# Patient Record
Sex: FEMALE | Race: BLACK OR AFRICAN AMERICAN | NOT HISPANIC OR LATINO | Employment: FULL TIME | URBAN - METROPOLITAN AREA
[De-identification: names, ages, dates, MRNs, and addresses within clinical notes are randomized per-mention and may not be internally consistent; named-entity substitution may affect disease eponyms.]

---

## 2021-10-04 ENCOUNTER — PRENATAL OFFICE VISIT (OUTPATIENT)
Dept: OBGYN | Facility: CLINIC | Age: 31
End: 2021-10-04

## 2021-10-04 VITALS — BODY MASS INDEX: 30.82 KG/M2 | WEIGHT: 185 LBS | HEIGHT: 65 IN

## 2021-10-04 DIAGNOSIS — Z34.90 SUPERVISION OF NORMAL PREGNANCY: Primary | ICD-10-CM

## 2021-10-04 PROCEDURE — 99207 PR NO CHARGE NURSE ONLY: CPT

## 2021-10-04 ASSESSMENT — MIFFLIN-ST. JEOR: SCORE: 1565.03
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
## 2021-10-11 ENCOUNTER — OFFICE VISIT (OUTPATIENT)
Dept: URGENT CARE | Facility: URGENT CARE | Age: 31
End: 2021-10-11

## 2021-10-11 VITALS
HEART RATE: 92 BPM | DIASTOLIC BLOOD PRESSURE: 79 MMHG | BODY MASS INDEX: 33.12 KG/M2 | SYSTOLIC BLOOD PRESSURE: 117 MMHG | WEIGHT: 199 LBS | TEMPERATURE: 99.1 F

## 2021-10-11 DIAGNOSIS — O20.9 VAGINAL BLEEDING IN PREGNANT PATIENT AT LESS THAN 20 WEEKS GESTATION: Primary | ICD-10-CM

## 2021-10-11 NOTE — PROGRESS NOTES
Patient presenting with vaginal spotting today. Patient is approximately 10 weeks pregnant.  Sent to ED for evaluation and treatment - advanced imaging.  Patient left in stable condition.

## 2021-10-12 ENCOUNTER — TELEPHONE (OUTPATIENT)
Dept: OBGYN | Facility: CLINIC | Age: 31
End: 2021-10-12

## 2021-10-12 DIAGNOSIS — O20.0 THREATENED MISCARRIAGE: Primary | ICD-10-CM

## 2021-10-12 NOTE — TELEPHONE ENCOUNTER
, 10w2d.  New Prenatal appt is scheduled with Dr. Cedillo on 10/19.    Pt states she went to the  ED last night due to spotting and cramping.  They did an HCG quant blood test that was 17,000 and a transvaginal US.  They told her it was too early to see everything on US.  They advised her to f/u with her OB provider in 2 days for another HCG quant blood test.    Pt attempted to schedule a f/u appt with Dr. Cedillo but he had no availability this week.    Routing to Dr. Cedillo to see if he would like to place orders for serial HCG quants or if he would like pt to be seen by another OB provider for an ED f/u.    Meena Lopez RN

## 2021-10-12 NOTE — TELEPHONE ENCOUNTER
I called Jenny and reviewed the ultrasound and the quant.  I suggest to repeat the ultrasound at 2 weeks ([facility] guidelines).  The quants may be performed, but I feel they are best in very early pregnancy.  The repeat ultrasound gives more information for me.    The lab order for the ABO and Rh placed (does not seem to have been done in the ER).   She will call [phone] to schedule for the ultrasound.  A new appointment in 2 weeks is made (too early for the first ob based on the ER Ultrasound) for follow up.   If the bleeding changes (pink spotting at this time) the plan will be updated.   Rex Cedillo MD

## 2021-10-16 ENCOUNTER — HEALTH MAINTENANCE LETTER (OUTPATIENT)
Age: 31
End: 2021-10-16

## 2021-10-26 ENCOUNTER — OFFICE VISIT (OUTPATIENT)
Dept: OBGYN | Facility: CLINIC | Age: 31
End: 2021-10-26

## 2021-10-26 VITALS
BODY MASS INDEX: 33.45 KG/M2 | WEIGHT: 201 LBS | HEART RATE: 80 BPM | DIASTOLIC BLOOD PRESSURE: 75 MMHG | SYSTOLIC BLOOD PRESSURE: 109 MMHG | OXYGEN SATURATION: 97 %

## 2021-10-26 DIAGNOSIS — O03.9 SPONTANEOUS MISCARRIAGE: Primary | ICD-10-CM

## 2021-10-26 DIAGNOSIS — Z67.90 BLOOD TYPE, RH POSITIVE: ICD-10-CM

## 2021-10-26 PROCEDURE — 99203 OFFICE O/P NEW LOW 30 MIN: CPT | Performed by: OBSTETRICS & GYNECOLOGY

## 2021-10-26 ASSESSMENT — PATIENT HEALTH QUESTIONNAIRE - PHQ9: SUM OF ALL RESPONSES TO PHQ QUESTIONS 1-9: 11

## 2021-10-26 NOTE — PROGRESS NOTES
Jenny is a 31 year old  (Patient's last menstrual period was 2021.) at 12.2 weeks based on LMP.  She is here for follow up from the ER.   She had been seen for vaginal bleeding and abdominal pain.   She had been using 4 pads a day for the bleeding.  She had some clots.  She might have passed tissue.    She reports that since she was seen over 1 week ago, she stopped bleeding.  She has not had any fever, chills.      We reviewed the 2 ultrasound reports she had at [facility] [place] and that the gestation seems to have been lowering in the uterus.      10/15/2021  US OB < 11 WEEKS TA VAGINAL   Final Result   IMPRESSION:     Definite intrauterine pregnancy as shown on 10/11/2021. The current examination shows that the gestational sac has moved from the upper endometrial cavity down into the lower uterine segment/endocervical canal, in keeping with spontaneous  in progress. A small yolk sac was present within the gestational sac, not seen clearly today. No identifiable embryo or embryonic cardiac activity.       10/11/2021   US:  OB<11 weeks TA and TV  IMPRESSION:   1.  Probable very early intrauterine pregnancy with sonographic gestational age of six weeks two days.  Gestational sac is with an internal yolk sac.  Recommend repeat sonogram in 11 days.  If no embryo is identified on the followup exam, it is a pregnancy failure. Serial beta hCG may be helpful as well.   2.  Nonvisualized ovaries.   3.  No abnormal adnexal mass or free pelvic fluid.        Quant HCG levels from [facility] [place]    10/11/2021  HCG TOTAL, SERUM 17,907 (H)       10/15/2021          HCG TOTAL, SERUM 13,124 (*)         No past medical history on file.    Past Surgical History:   Procedure Laterality Date     vaginal cyst removal          No Known Allergies    Current Outpatient Medications   Medication Sig Dispense Refill     Prenatal Vit-Fe Fumarate-FA (PRENATAL VITAMIN PO)  (Patient not taking: Reported on 10/26/2021)         Social History      Socioeconomic History     Marital status: Single     Spouse name: Not on file     Number of children: Not on file     Years of education: Not on file     Highest education level: Not on file   Occupational History     Not on file   Tobacco Use     Smoking status: Never Smoker     Smokeless tobacco: Never Used   Vaping Use     Vaping Use: Former     Substances: Nicotine, Flavoring   Substance and Sexual Activity     Alcohol use: Not Currently     Drug use: Not Currently     Types: Marijuana     Sexual activity: Yes     Partners: Male   Other Topics Concern     Not on file   Social History Narrative     Not on file     Social Determinants of Health     Financial Resource Strain:      Difficulty of Paying Living Expenses:    Food Insecurity:      Worried About Running Out of Food in the Last Year:      Ran Out of Food in the Last Year:    Transportation Needs:      Lack of Transportation (Medical):      Lack of Transportation (Non-Medical):    Physical Activity:      Days of Exercise per Week:      Minutes of Exercise per Session:    Stress:      Feeling of Stress :    Social Connections:      Frequency of Communication with Friends and Family:      Frequency of Social Gatherings with Friends and Family:      Attends Yarsanism Services:      Active Member of Clubs or Organizations:      Attends Club or Organization Meetings:      Marital Status:    Intimate Partner Violence:      Fear of Current or Ex-Partner:      Emotionally Abused:      Physically Abused:      Sexually Abused:        Family History   Problem Relation Age of Onset     Cerebrovascular Disease Mother      Diabetes Mother      Cerebrovascular Disease Father      Diabetes Father      Migraines Sister      Migraines Sister          Review of Systems:  10 point ROS of systems including Constitutional, Eyes, Respiratory, Cardiovascular, Gastroenterology, Genitourinary, Integumentary, Muscularskeletal, Psychiatric were all negative except for pertinent  positives noted in my HPI and in the PMH.      Exam  /75 (BP Location: Right arm, Cuff Size: Adult Regular)   Pulse 80   Wt 91.2 kg (201 lb)   LMP 08/01/2021   SpO2 97%   BMI 33.45 kg/m     PHQ-9=11  General:  WNWD female, NAD  Alert  Oriented x 3  Gait:  Normal  Skin:  Normal skin turgor  HEENT:  NC/AT, EOMI  Abdomen:  Non-tender, non-distended.  Vulva: No external lesions, normal hair distribution, no adenopathy  BUS:  Normal, no masses noted  Urethra:  No hypermobility seen  Urethral meatus:  No masses noted  Vagina: Moist, pink, well rugated, no lesions  Cervix: Closed. Smooth, pink, no visible lesions  Uterus: Normal size, anteverted, non-tender, mobile  Ovaries: No mass, non-tender, mobile  Perianal:  No masses noted  Extremities:  No clubbing, no cyanosis and no edema.      Assessment:  Spontaneous miscarriage  Rh Positive      Plan:  We reviewed miscarriages and that miscarriage occurs ~ 1 in 5 pregnancy events and that there was no direct event or prevention  that the patient could have avoided or performed.  Discussed that there are many etiologies for miscarriage, the most common being a genetic anomaly. The risk for a miscarriage increases with advancing maternal age due to a higher incidence of conceptuses with a chromosomal aneuploidy. The risk may approach 75% in women who are 45 years of age and older.  In about 50% of couples with recurrent pregnancy loss, the etiology remains unknown despite a thorough evaluation and is therefore classified as idiopathic. It is estimated that couples with idiopathic recurrent pregnancy loss can have up to a 75% chance of having a successful pregnancy. Reviewed that risk of miscarriage for next pregnancy is not elevated by the current event.  Commonly reported causes of recurrent pregnancy loss include the following:  Endocrine            Uncontrolled diabetes mellitus            Luteal phase deficiency (remains inconclusive, limited to 1st  trimester)            Polycystic ovary syndrome  Environmental agents            Prolonged exposure to alcohol, smoking            Immunologic            Antiphospholipid syndrome  Maternal factors (acquired, inherited, structural)            Uterine anatomic malformations            Myomas            Cervical abnormalities  Chromosomal and single gene disorders            Fetal chromosomal abnormalities            Parental balanced translocation            Alpha thalassemia major            Thrombophilia            X-linked male lethal conditions     I suggest to repeat the UPT (home pregnancy test is ok) in about one week.  If it is positive, then will need to consider following quant HCG levels.    Her first cycle after the miscarriage will likely be ovulatory.  Consider waiting 2 months prior to attempting pregnancy again.  More or less time might be needed for psychological healing.   Questions seem to be answered.     Total time preparing to see patient with reviewing prior encounter and labs, face to face time,  and coordinating care on the same calendar date:  35 minutes    Rex Cedillo MD

## 2022-09-25 ENCOUNTER — HEALTH MAINTENANCE LETTER (OUTPATIENT)
Age: 32
End: 2022-09-25

## 2023-02-04 ENCOUNTER — HEALTH MAINTENANCE LETTER (OUTPATIENT)
Age: 33
End: 2023-02-04

## 2023-04-07 ENCOUNTER — OFFICE VISIT (OUTPATIENT)
Dept: FAMILY MEDICINE | Facility: CLINIC | Age: 33
End: 2023-04-07

## 2023-04-07 VITALS
RESPIRATION RATE: 19 BRPM | BODY MASS INDEX: 32.49 KG/M2 | HEIGHT: 65 IN | HEART RATE: 87 BPM | OXYGEN SATURATION: 98 % | WEIGHT: 195 LBS | SYSTOLIC BLOOD PRESSURE: 99 MMHG | TEMPERATURE: 97.8 F | DIASTOLIC BLOOD PRESSURE: 69 MMHG

## 2023-04-07 DIAGNOSIS — J18.9 PNEUMONIA OF LEFT LOWER LOBE DUE TO INFECTIOUS ORGANISM: Primary | ICD-10-CM

## 2023-04-07 PROCEDURE — 99202 OFFICE O/P NEW SF 15 MIN: CPT | Performed by: PHYSICIAN ASSISTANT

## 2023-04-07 ASSESSMENT — PATIENT HEALTH QUESTIONNAIRE - PHQ9
SUM OF ALL RESPONSES TO PHQ QUESTIONS 1-9: 10
10. IF YOU CHECKED OFF ANY PROBLEMS, HOW DIFFICULT HAVE THESE PROBLEMS MADE IT FOR YOU TO DO YOUR WORK, TAKE CARE OF THINGS AT HOME, OR GET ALONG WITH OTHER PEOPLE: SOMEWHAT DIFFICULT
SUM OF ALL RESPONSES TO PHQ QUESTIONS 1-9: 10

## 2023-04-07 NOTE — LETTER
April 7, 2023      Jenny Brink  [address] [place] [address]        To Whom It May Concern:    Jenny Brink is a patient at our clinic. She was hospitalized which affected her ability to work. Please allow late payment of rent due to these circumstances. Please contact our clinic with any questions.     Sincerely,        Brittny Alegre PA-C

## 2023-04-07 NOTE — LETTER
April 7, 2023      Jenny Brink  [address] [place] [address]        To Whom It May Concern:    Jenny Brink was seen in clinic. Please excuse her tardiness.         Sincerely,        Brittny Alegre PA-C

## 2023-04-07 NOTE — PROGRESS NOTES
{PROVIDER CHARTING PREFERENCE:002544}    Subjective   Jenny is a 33 year old, presenting for the following health issues:  Hospital F/U         View : No data to display.              HPI     {SUPERLIST (Optional):881278}  {additonal problems for provider to add (Optional):894438}      Review of Systems   {ROS COMP (Optional):256188}      Objective    There were no vitals taken for this visit.  There is no height or weight on file to calculate BMI.  Physical Exam   {Exam List (Optional):733714}    {Diagnostic Test Results (Optional):273610}    {AMBULATORY ATTESTATION (Optional):401594}            Answers for HPI/ROS submitted by the patient on 4/7/2023  If you checked off any problems, how difficult have these problems made it for you to do your work, take care of things at home, or get along with other people?: Somewhat difficult  PHQ9 TOTAL SCORE: 10

## 2023-04-07 NOTE — PROGRESS NOTES
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 Patient will follow up with me for a physical in the near future.              MED REC REQUIRED  Post Medication Reconciliation Status:       BMI:   Estimated body mass index is 32.45 kg/m  as calculated from the following:    Height as of this encounter: 1.651 m (5' 5\").    Weight as of this encounter: 88.5 kg (195 lb).   Weight management plan: Discussed healthy diet and exercise guidelines    Depression Screening Follow Up        4/7/2023     9:08 AM   PHQ   PHQ-9 Total Score 10   Q9: Thoughts of better off dead/self-harm past 2 weeks Not at all         Follow Up Actions Taken  Crisis resource information provided in After Visit Summary         LATHA [facility] ADALBERTO Alvarado is a 33 year old, presenting for the following health issues:  Hospital F/U        4/7/2023     9:18 AM   Additional Questions   Roomed by Salome ROQUE   Accompanied by self     HPI       Hospital Follow-up Visit:    Hospital/Nursing Home/ Rehab Facility: [facility]   Date of Admission: 3-24-23   Date of Discharge:3-27-23   Reason(s) for Admission: chills,fever,vomiting     Was your hospitalization related to COVID-19? No   Problems taking medications regularly:  None  Medication changes since discharge: None  Problems adhering to non-medication therapy:  None    Summary of hospitalization:  CareEverywhere information obtained and reviewed  Diagnostic Tests/Treatments reviewed.  Follow up needed: none  Other Healthcare Providers Involved in Patient s Care:         None  Update since discharge: improved.         Plan of care communicated with patient           Finished all antibiotics.   Feeling better. Cough has resolved.   Slight fatigue.           Review of Systems         Objective    BP 99/69   Pulse 87   Temp 97.8  F (36.6  C) (Oral)   Resp 19   Ht " "1.651 m (5' 5\")   Wt 88.5 kg (195 lb)   SpO2 98%   BMI 32.45 kg/m    Body mass index is 32.45 kg/m .  Physical Exam   GENERAL: healthy, alert and no distress  HENT: ear canals and TM's normal, nose and mouth without ulcers or lesions  NECK: no adenopathy, no asymmetry, masses, or scars and thyroid normal to palpation  RESP: lungs clear to auscultation - no rales, rhonchi or wheezes  CV: regular rate and rhythm, normal S1 S2, no S3 or S4, no murmur,                     Answers for HPI/ROS submitted by the patient on 4/7/2023  If you checked off any problems, how difficult have these problems made it for you to do your work, take care of things at home, or get along with other people?: Somewhat difficult  PHQ9 TOTAL SCORE: 10      "

## 2023-05-19 ENCOUNTER — OFFICE VISIT (OUTPATIENT)
Dept: FAMILY MEDICINE | Facility: CLINIC | Age: 33
End: 2023-05-19

## 2023-05-19 ENCOUNTER — ANCILLARY PROCEDURE (OUTPATIENT)
Dept: GENERAL RADIOLOGY | Facility: CLINIC | Age: 33
End: 2023-05-19
Attending: FAMILY MEDICINE

## 2023-05-19 VITALS
TEMPERATURE: 98 F | DIASTOLIC BLOOD PRESSURE: 69 MMHG | OXYGEN SATURATION: 98 % | HEIGHT: 65 IN | RESPIRATION RATE: 20 BRPM | SYSTOLIC BLOOD PRESSURE: 107 MMHG | HEART RATE: 110 BPM | BODY MASS INDEX: 32.82 KG/M2 | WEIGHT: 197 LBS

## 2023-05-19 DIAGNOSIS — R06.02 SOB (SHORTNESS OF BREATH): ICD-10-CM

## 2023-05-19 DIAGNOSIS — J02.9 SORE THROAT: Primary | ICD-10-CM

## 2023-05-19 DIAGNOSIS — J20.9 ACUTE BRONCHITIS, UNSPECIFIED ORGANISM: ICD-10-CM

## 2023-05-19 DIAGNOSIS — R05.1 ACUTE COUGH: ICD-10-CM

## 2023-05-19 LAB
DEPRECATED S PYO AG THROAT QL EIA: NEGATIVE
GROUP A STREP BY PCR: NOT DETECTED

## 2023-05-19 PROCEDURE — 71046 X-RAY EXAM CHEST 2 VIEWS: CPT | Mod: TC | Performed by: RADIOLOGY

## 2023-05-19 PROCEDURE — 87651 STREP A DNA AMP PROBE: CPT | Performed by: FAMILY MEDICINE

## 2023-05-19 PROCEDURE — 99214 OFFICE O/P EST MOD 30 MIN: CPT | Performed by: FAMILY MEDICINE

## 2023-05-19 RX ORDER — AZITHROMYCIN 250 MG/1
TABLET, FILM COATED ORAL
Qty: 6 TABLET | Refills: 0 | Status: SHIPPED | OUTPATIENT
Start: 2023-05-19 | End: 2023-05-24

## 2023-05-19 NOTE — PROGRESS NOTES
Assessment & Plan     Sore throat  Negative strep.   - Streptococcus A Rapid Screen w/Reflex to PCR - Clinic Collect  - Group A Streptococcus PCR Throat Swab    SOB (shortness of breath)  cxr unremarkable per my review. Will treat for bronchitis with z-pack. Awaiting radiology review.   - XR Chest 2 Views  - azithromycin (ZITHROMAX) 250 MG tablet  Dispense: 6 tablet; Refill: 0    Acute cough  See above  - XR Chest 2 Views  - azithromycin (ZITHROMAX) 250 MG tablet  Dispense: 6 tablet; Refill: 0    Acute bronchitis, unspecified organism  See above.   - azithromycin (ZITHROMAX) 250 MG tablet  Dispense: 6 tablet; Refill: 0       Nicotine/Tobacco Cessation:  She reports that she has been smoking hookah. She has never used smokeless tobacco.  Nicotine/Tobacco Cessation Plan:   Information offered: Patient not interested at this time          Jackie Wu MD  [facility]    Alcides Alvarado is a 33 year old, presenting for the following health issues:  Cough (Dry cough, SOB, chest congestion, fever earlier this week, sore throat x 1 week)      History of Present Illness       Reason for visit:  Sore throat, cough,and shortness of breath  Symptom onset:  3-7 days ago  Symptom intensity:  Severe  Symptom progression:  Staying the same  Had these symptoms before:  No    She eats 0-1 servings of fruits and vegetables daily.She consumes 1 sweetened beverage(s) daily.She exercises with enough effort to increase her heart rate 30 to 60 minutes per day.  She exercises with enough effort to increase her heart rate 3 or less days per week.   She is taking medications regularly.     Pneumonia in March 2023 admitted to [facility] after she developed cough fever and chills had a CT scan with contrast that revealed left lower lobe opacities compatible with previously acquired pneumonia she was treated IV antibiotics initially then switched to Levaquin 750 daily for 5  "days        Review of Systems   Constitutional, HEENT, cardiovascular, pulmonary, gi and gu systems are negative, except as otherwise noted.      Objective    /69   Pulse 110   Temp 98  F (36.7  C) (Oral)   Resp 20   Ht 1.651 m (5' 5\")   Wt 89.4 kg (197 lb)   LMP 04/20/2023 (Approximate)   SpO2 98%   BMI 32.78 kg/m    Body mass index is 32.78 kg/m .  Physical Exam   GENERAL: healthy, alert and no distress  HEENT: TM left normal, right TM not visualized d/t cerumen impaction, nasal turbinates mildly erythematous but clear discharge but not sinus tenderness, posterior oropharynx unremarkable.   NECK: no adenopathy, no asymmetry, masses, or scars and thyroid normal to palpation  RESP: lungs clear to auscultation - no rales, rhonchi or wheezes  CV: regular rate and rhythm, normal S1 S2, no S3 or S4, no murmur, click or rub  MS: no gross musculoskeletal defects noted, no edema  NEURO: Normal strength and tone, mentation intact and speech normal  PSYCH: mentation appears normal, affect normal/bright    Results for orders placed or performed in visit on 05/19/23 (from the past 24 hour(s))   Streptococcus A Rapid Screen w/Reflex to PCR - Clinic Collect    Specimen: Throat; Swab   Result Value Ref Range    Group A Strep antigen Negative Negative                   "

## 2024-03-02 ENCOUNTER — HEALTH MAINTENANCE LETTER (OUTPATIENT)
Age: 34
End: 2024-03-02

## 2024-08-06 ENCOUNTER — HOSPITAL ENCOUNTER (EMERGENCY)
Facility: CLINIC | Age: 34
Discharge: HOME | End: 2024-08-06
Attending: EMERGENCY MEDICINE | Admitting: EMERGENCY MEDICINE
Payer: OTHER MISCELLANEOUS

## 2024-08-06 VITALS
HEART RATE: 83 BPM | DIASTOLIC BLOOD PRESSURE: 89 MMHG | TEMPERATURE: 98.1 F | OXYGEN SATURATION: 100 % | BODY MASS INDEX: 31.99 KG/M2 | SYSTOLIC BLOOD PRESSURE: 126 MMHG | WEIGHT: 192.02 LBS | HEIGHT: 65 IN | RESPIRATION RATE: 20 BRPM

## 2024-08-06 DIAGNOSIS — M54.6 ACUTE BILATERAL THORACIC BACK PAIN: ICD-10-CM

## 2024-08-06 PROCEDURE — 250N000013 HC RX MED GY IP 250 OP 250 PS 637: Performed by: EMERGENCY MEDICINE

## 2024-08-06 PROCEDURE — 99283 EMERGENCY DEPT VISIT LOW MDM: CPT

## 2024-08-06 RX ORDER — LIDOCAINE 4 G/G
1 PATCH TOPICAL EVERY 24 HOURS
Qty: 6 PATCH | Refills: 0 | Status: SHIPPED | OUTPATIENT
Start: 2024-08-06

## 2024-08-06 RX ORDER — METHOCARBAMOL 500 MG/1
500 TABLET, FILM COATED ORAL 4 TIMES DAILY PRN
Qty: 12 TABLET | Refills: 0 | Status: SHIPPED | OUTPATIENT
Start: 2024-08-06

## 2024-08-06 RX ORDER — ACETAMINOPHEN 325 MG/1
975 TABLET ORAL ONCE
Status: COMPLETED | OUTPATIENT
Start: 2024-08-06 | End: 2024-08-06

## 2024-08-06 RX ORDER — IBUPROFEN 600 MG/1
600 TABLET, FILM COATED ORAL ONCE
Status: COMPLETED | OUTPATIENT
Start: 2024-08-06 | End: 2024-08-06

## 2024-08-06 RX ADMIN — IBUPROFEN 600 MG: 600 TABLET, FILM COATED ORAL at 14:15

## 2024-08-06 RX ADMIN — ACETAMINOPHEN 975 MG: 325 TABLET, FILM COATED ORAL at 14:15

## 2024-08-06 ASSESSMENT — COLUMBIA-SUICIDE SEVERITY RATING SCALE - C-SSRS
6. HAVE YOU EVER DONE ANYTHING, STARTED TO DO ANYTHING, OR PREPARED TO DO ANYTHING TO END YOUR LIFE?: NO
2. HAVE YOU ACTUALLY HAD ANY THOUGHTS OF KILLING YOURSELF IN THE PAST MONTH?: NO
1. IN THE PAST MONTH, HAVE YOU WISHED YOU WERE DEAD OR WISHED YOU COULD GO TO SLEEP AND NOT WAKE UP?: NO

## 2024-08-06 ASSESSMENT — ACTIVITIES OF DAILY LIVING (ADL): ADLS_ACUITY_SCORE: 33
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
## 2024-08-06 NOTE — LETTER
August 6, 2024      To Whom It May Concern:      Jenny Brink was seen in our Emergency Department today, 08/06/24.  I expect her condition to improve over the next 2 days.  She may return to work/school when improved.    Sincerely,        Harriett GAVIRIA
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
## 2024-08-08 ENCOUNTER — OFFICE VISIT (OUTPATIENT)
Dept: FAMILY MEDICINE | Facility: CLINIC | Age: 34
End: 2024-08-08
Payer: OTHER MISCELLANEOUS

## 2024-08-08 VITALS
HEART RATE: 74 BPM | RESPIRATION RATE: 14 BRPM | WEIGHT: 190.5 LBS | DIASTOLIC BLOOD PRESSURE: 72 MMHG | OXYGEN SATURATION: 98 % | SYSTOLIC BLOOD PRESSURE: 124 MMHG | HEIGHT: 65 IN | TEMPERATURE: 97 F | BODY MASS INDEX: 31.74 KG/M2

## 2024-08-08 DIAGNOSIS — S23.9XXD THORACIC BACK SPRAIN, SUBSEQUENT ENCOUNTER: Primary | ICD-10-CM

## 2024-08-08 PROCEDURE — 99213 OFFICE O/P EST LOW 20 MIN: CPT | Performed by: FAMILY MEDICINE

## 2024-08-08 PROCEDURE — G2211 COMPLEX E/M VISIT ADD ON: HCPCS | Performed by: FAMILY MEDICINE

## 2024-08-08 RX ORDER — MELOXICAM 15 MG/1
15 TABLET ORAL DAILY
Qty: 90 TABLET | Refills: 3 | Status: SHIPPED | OUTPATIENT
Start: 2024-08-08
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
## 2024-08-08 NOTE — LETTER
August 8, 2024      Jenny Brink  [address] [facility] [address]        To Whom It May Concern:    Jenny Brink was seen in our clinic. She may return to work without restrictions on August 10th, 2024.      Sincerely,        DENG DAWKINS MD

## 2024-08-08 NOTE — LETTER
August 8, 2024      Jenny Brink  [address] [facility] [address]        To Whom It May Concern:    Jenny Brink was seen in our clinic. She may return to work without restrictions August 12th, 2024.      Sincerely,        DENG DAWKINS MD

## 2024-11-19 ENCOUNTER — TELEPHONE (OUTPATIENT)
Dept: FAMILY MEDICINE | Facility: CLINIC | Age: 34
End: 2024-11-19
Payer: COMMERCIAL

## 2024-11-19 NOTE — TELEPHONE ENCOUNTER
November 19, 2024    [facility] Provider Report was received via fax for Dr. Bennett.  Patient label was attached to paperwork and placed in provider's inbox to be signed.    Jenni Schmidt

## 2025-03-15 ENCOUNTER — HEALTH MAINTENANCE LETTER (OUTPATIENT)
Age: 35
End: 2025-03-15